# Patient Record
Sex: FEMALE | Race: WHITE | URBAN - METROPOLITAN AREA
[De-identification: names, ages, dates, MRNs, and addresses within clinical notes are randomized per-mention and may not be internally consistent; named-entity substitution may affect disease eponyms.]

---

## 2022-07-25 ENCOUNTER — OFFICE VISIT (OUTPATIENT)
Dept: ORTHOPEDIC SURGERY | Age: 72
End: 2022-07-25
Payer: COMMERCIAL

## 2022-07-25 VITALS — BODY MASS INDEX: 24.27 KG/M2 | HEIGHT: 63 IN | TEMPERATURE: 98 F

## 2022-07-25 DIAGNOSIS — M75.01 ADHESIVE CAPSULITIS OF RIGHT SHOULDER: ICD-10-CM

## 2022-07-25 DIAGNOSIS — G89.29 CHRONIC RIGHT SHOULDER PAIN: Primary | ICD-10-CM

## 2022-07-25 DIAGNOSIS — M25.511 CHRONIC RIGHT SHOULDER PAIN: Primary | ICD-10-CM

## 2022-07-25 PROCEDURE — 99203 OFFICE O/P NEW LOW 30 MIN: CPT | Performed by: ORTHOPAEDIC SURGERY

## 2022-07-25 PROCEDURE — 1123F ACP DISCUSS/DSCN MKR DOCD: CPT | Performed by: ORTHOPAEDIC SURGERY

## 2022-07-25 NOTE — PROGRESS NOTES
Riya Castro is a 71 y.o. female, who presents   Chief Complaint   Patient presents with    Shoulder Pain     Right shoulder pain worsening daily. Worsening the past 3 moths. Limits ROM . HPI[de-identified] Right shoulder pains been present for some time. This has been subsequent to a couple of falls. She 1 was 2 outstretched hand and the other was to the shoulder area. She had some limitation range of motion and some crepitus as well as some weakness. There is also been suggestion of some tingling in the forearm. This does not seem to be her main problem nor consistent. There is a history of a cervical spinal fusion in the past.    Allergies; medications; past medical, surgical, family, and social history; and problem list have been reviewed today and updated as indicated in this encounter - see below following Ortho specifics. Musculoskeletal: Skin condition gross neurovascular functions good in the right upper extremity. Before meals and glenohumeral joints are grossly stable. Strength seems to be intact in the shoulder girdle area. There is discomfort with any elevation of the arm and the range of motion is significantly limited which prompted x-rays. Radiologic Studies: Imaging studies show slight decrease suggesting the subacromial space though it is within normal limits. The humeral head appears to ride a little high in the glenoid on 1 view. The [facility] joint is grossly well aligned. There are no obvious hypertrophic degenerative changes. ASSESSMENT:  Mitch Lunsford was seen today for shoulder pain. Diagnoses and all orders for this visit:    Chronic right shoulder pain  -     XR SHOULDER RIGHT (MIN 2 VIEWS);  Future  -     External Referral To Physical Therapy    Adhesive capsulitis of right shoulder  -     External Referral To Physical Therapy     Treatment alternatives were reviewed including medical and physical therapies, injections, and surgical options, expected risks benefits and likely outcome of each were discussed in detail, questions asked and answered and understood. We discussed symptom as well as physical findings and imaging results. Findings are consistent with adhesive capsulitis from clinical standpoint. There are no other outstanding abnormalities noted. PLAN: We discussed treatment with the primary option being physical therapy to help improve mobility and along with the comfort and time. She is willing to go for therapy and we will refer her in follow-up in about 4 weeks. There is no problem list on file for this patient. Past Medical History:   Diagnosis Date    Fibromyalgia     Migraine        Past Surgical History:   Procedure Laterality Date    BREAST SURGERY      CERVICAL FUSION  1991    TONSILLECTOMY         Current Outpatient Medications   Medication Sig Dispense Refill    zolpidem (AMBIEN) 10 MG tablet Take by mouth      traZODone (DESYREL) 150 MG tablet TAKE 1 TABLET BY MOUTH AT BEDTIME  1    predniSONE (DELTASONE) 5 MG tablet TAKE 1 TAB 3 TIMES DAILY X7DAYS, THEN 1 TAB TWICE DAILY X7DAYS, THEN 1 TAB DAILY X7DAYS  0    furosemide (LASIX) 20 MG tablet TAKE 1 TABLET EVERY DAY BY MOUTH AS NEEDED  1    Lansoprazole (PREVACID PO) Take by mouth       No current facility-administered medications for this visit.        Allergies   Allergen Reactions    Penicillins        Social History     Socioeconomic History    Marital status:      Spouse name: None    Number of children: None    Years of education: None    Highest education level: None   Tobacco Use    Smoking status: Never   Substance and Sexual Activity    Alcohol use: No    Drug use: No    Sexual activity: Yes     Partners: Male       Family History   Problem Relation Age of Onset    Breast Cancer Maternal Grandmother     Cancer Maternal Grandfather         PANCREATIC CA    Heart Disease Father     Hypertension Father     Diabetes Father     Diabetes Sister     Colon Cancer Other         UNCLE; COUSIN         Review of Systems:   As follows except as previously noted in HPI:  Constitutional: Negative for chills, diaphoresis,  fever   Respiratory: Negative for cough, shortness of breath and wheezing. Cardiovascular: Negative for chest pain and palpitations. Neurological: Negative for dizziness, syncope,   GI / : abdominal pain or cramping  Musculoskeletal: see HPI       Objective:   Physical Exam   Constitutional: Oriented to person, place, and time. and appears well-developed and well-nourished. :   Head: Normocephalic and atraumatic. Neck: Neck supple. Eyes: EOM are normal.   Pulmonary/Chest: Effort normal.  No respiratory distress, no wheezes. Neurological: Alert and oriented to person  Skin: Skin is warm and dry. Ethel He DO    7/25/22  3:03 PM    All reasonable efforts have been made to minimize the risk of errors that may occur in the use of voice recognition and other electronic means of charting.